# Patient Record
Sex: MALE | Race: OTHER | HISPANIC OR LATINO | Employment: FULL TIME | URBAN - METROPOLITAN AREA
[De-identification: names, ages, dates, MRNs, and addresses within clinical notes are randomized per-mention and may not be internally consistent; named-entity substitution may affect disease eponyms.]

---

## 2017-05-04 ENCOUNTER — TELEPHONE (OUTPATIENT)
Dept: MEDICAL GROUP | Facility: MEDICAL CENTER | Age: 27
End: 2017-05-04

## 2017-05-04 NOTE — TELEPHONE ENCOUNTER
Phone Number Called: [phone] (home)     Message: Left msg for patient reminding him of appt with . I told patient to provide ins, med, and to arrive 15 mns prior to appt.    Left Message for patient to call back: N\A

## 2017-05-11 ENCOUNTER — OFFICE VISIT (OUTPATIENT)
Dept: MEDICAL GROUP | Facility: MEDICAL CENTER | Age: 27
End: 2017-05-11
Payer: COMMERCIAL

## 2017-05-11 VITALS
HEART RATE: 64 BPM | OXYGEN SATURATION: 98 % | HEIGHT: 74 IN | SYSTOLIC BLOOD PRESSURE: 116 MMHG | BODY MASS INDEX: 24.2 KG/M2 | TEMPERATURE: 99.9 F | WEIGHT: 188.6 LBS | RESPIRATION RATE: 16 BRPM | DIASTOLIC BLOOD PRESSURE: 74 MMHG

## 2017-05-11 DIAGNOSIS — N46.9: ICD-10-CM

## 2017-05-11 DIAGNOSIS — Z76.89 ENCOUNTER TO ESTABLISH CARE: ICD-10-CM

## 2017-05-11 PROCEDURE — 99204 OFFICE O/P NEW MOD 45 MIN: CPT | Performed by: PHYSICIAN ASSISTANT

## 2017-05-11 ASSESSMENT — PATIENT HEALTH QUESTIONNAIRE - PHQ9: CLINICAL INTERPRETATION OF PHQ2 SCORE: 0

## 2017-05-11 NOTE — ASSESSMENT & PLAN NOTE
This is a 26-year-old male who is here today to get a semen analysis. He and his wife have been trying to have children. They're been issues with him having possibly infertility issues. Her infertility physician is asking for him to have a semen analysis performed. His wife is currently on Clomid and is being treated for PCOS. He has no health concerns. He has no medical history. No known family history. Doesn't smoke drink or do any drugs.

## 2017-05-11 NOTE — PROGRESS NOTES
"Subjective:   Philip Jang is a 26 y.o. male here today for a request for semen analysis.    Infertility, male, mild  This is a 26-year-old male who is here today to get a semen analysis. He and his wife have been trying to have children. They're been issues with him having possibly infertility issues. Her infertility physician is asking for him to have a semen analysis performed. His wife is currently on Clomid and is being treated for PCOS. He has no health concerns. He has no medical history. No known family history. Doesn't smoke drink or do any drugs.       Current medicines (including changes today)  No current outpatient prescriptions on file.     No current facility-administered medications for this visit.     He  has no past medical history on file.    ROS   No chest pain, no shortness of breath, no abdominal pain and all other systems were reviewed and are negative.       Objective:     Blood pressure 116/74, pulse 64, temperature 37.7 °C (99.9 °F), resp. rate 16, height 1.867 m (6' 1.5\"), weight 85.548 kg (188 lb 9.6 oz), SpO2 98 %. Body mass index is 24.54 kg/(m^2).   Physical Exam:  Constitutional: Alert, no distress.  Skin: Warm, dry, good turgor, no rashes in visible areas.  Eye: Equal, round and reactive, conjunctiva clear, lids normal.  ENMT: Lips without lesions, good dentition, oropharynx clear.  Neck: Trachea midline, no masses.   Lymph: No cervical or supraclavicular lymphadenopathy  Respiratory: Unlabored respiratory effort, lungs clear to auscultation, no wheezes, no ronchi.  Cardiovascular: Normal S1, S2, no murmur, no edema.  Abdomen: Soft, non-tender, no masses.  Psych: Alert and oriented x3, normal affect and mood.        Assessment and Plan:   The following treatment plan was discussed    1. Infertility, male, mild  Ordered semen analysis. [facility] testing and then contact us after we can be hasn't been notified.  - SEMEN ANALYSIS,BASIC; Future    2. " Encounter to establish care        Followup: Return if symptoms worsen or fail to improve.    Please note that this dictation was created using voice recognition software. I have made every reasonable attempt to correct obvious errors, but I expect that there are errors of grammar and possibly content that I did not discover before finalizing the note.

## 2017-05-22 ENCOUNTER — TELEPHONE (OUTPATIENT)
Dept: MEDICAL GROUP | Facility: MEDICAL CENTER | Age: 27
End: 2017-05-22

## 2017-05-22 DIAGNOSIS — N46.9: ICD-10-CM

## 2017-05-22 NOTE — TELEPHONE ENCOUNTER
Phone Number Called: [phone] (home)     Message: Left message notifying patient that per provider his semen analysis was normal. Informed patient that if he needs a copy of the lab I can mail it to him.    Left Message for patient to call back: no

## 2024-01-11 ENCOUNTER — OFFICE VISIT (OUTPATIENT)
Dept: URGENT CARE | Facility: PHYSICIAN GROUP | Age: 34
End: 2024-01-11

## 2024-01-11 VITALS
SYSTOLIC BLOOD PRESSURE: 148 MMHG | HEART RATE: 70 BPM | OXYGEN SATURATION: 99 % | BODY MASS INDEX: 27.98 KG/M2 | WEIGHT: 218 LBS | RESPIRATION RATE: 18 BRPM | DIASTOLIC BLOOD PRESSURE: 82 MMHG | HEIGHT: 74 IN | TEMPERATURE: 98.2 F

## 2024-01-11 DIAGNOSIS — Z88.9 HX OF ALLERGY: ICD-10-CM

## 2024-01-11 DIAGNOSIS — H57.89 EYE SWELLING, BILATERAL: ICD-10-CM

## 2024-01-11 PROCEDURE — 99213 OFFICE O/P EST LOW 20 MIN: CPT

## 2024-01-11 PROCEDURE — 3079F DIAST BP 80-89 MM HG: CPT

## 2024-01-11 PROCEDURE — 3077F SYST BP >= 140 MM HG: CPT

## 2024-01-11 RX ORDER — METHYLPREDNISOLONE 4 MG/1
TABLET ORAL
Qty: 21 TABLET | Refills: 0 | Status: SHIPPED | OUTPATIENT
Start: 2024-01-11

## 2024-01-11 RX ORDER — DIPHENHYDRAMINE HCL 25 MG
25 TABLET ORAL EVERY 6 HOURS PRN
COMMUNITY

## 2024-01-11 RX ORDER — DIPHENHYDRAMINE HYDROCHLORIDE 50 MG/ML
25 INJECTION INTRAMUSCULAR; INTRAVENOUS ONCE
Status: COMPLETED | OUTPATIENT
Start: 2024-01-11 | End: 2024-01-11

## 2024-01-11 RX ADMIN — DIPHENHYDRAMINE HYDROCHLORIDE 25 MG: 50 INJECTION INTRAMUSCULAR; INTRAVENOUS at 09:59

## 2024-01-11 ASSESSMENT — ENCOUNTER SYMPTOMS
BLURRED VISION: 0
PHOTOPHOBIA: 0
EYE PAIN: 0
EYE REDNESS: 0
CHILLS: 0
EYE DISCHARGE: 0
DOUBLE VISION: 0
FEVER: 0

## 2024-01-11 ASSESSMENT — VISUAL ACUITY: OU: 1

## 2024-01-11 NOTE — PROGRESS NOTES
CHIEF COMPLAINT  Chief Complaint   Patient presents with    Facial Swelling     X2days      Subjective:   Philip Jang is a 33 y.o. male who presents for complaints of bilateral eye swelling.  Patient reports symptoms started 2 days ago after a visit to [place].  Patient reports that the pain will [place] and have similar symptoms.  He states that he took a Benadryl last night with mild improvement in symptoms.  Patient reports associated symptoms of itchiness to his eyes.  He denies any drainage to eyes.  He denies any changes in visual acuity.  Patient denies any recent history of URI or fevers.  Patient denies any swelling to lips, hoarseness of voice or itchiness to throat.  He states that he was placing antibiotic drops to eyes, and reports increased irritation with use.        Review of Systems   Constitutional:  Negative for chills and fever.   HENT:  Negative for congestion.    Eyes:  Negative for blurred vision, double vision, photophobia, pain, discharge and redness.       PAST MEDICAL HISTORY  Patient Active Problem List    Diagnosis Date Noted    Infertility, male, mild 05/11/2017       SURGICAL HISTORY  patient denies any surgical history    ALLERGIES  No Known Allergies    CURRENT MEDICATIONS  Home Medications       Reviewed by Johnnie Beverly (Medical Assistant) on 01/11/24 at 0943  Med List Status: <None>     Medication Last Dose Status   diphenhydrAMINE (BENADRYL) 25 MG Tab Taking Active                    SOCIAL HISTORY  Social History     Tobacco Use    Smoking status: Never    Smokeless tobacco: Never   Vaping Use    Vaping Use: Never used   Substance and Sexual Activity    Alcohol use: Yes     Comment: occ    Drug use: No    Sexual activity: Yes     Partners: Female     Birth control/protection: Condom       FAMILY HISTORY  Family History   Problem Relation Age of Onset    No Known Problems Mother          Medications, Allergies, and current problem list reviewed today in Epic.      "Objective:     BP (!) 148/82   Pulse 70   Temp 36.8 °C (98.2 °F) (Temporal)   Resp 18   Ht 1.88 m (6' 2\")   Wt 98.9 kg (218 lb)   SpO2 99%     Physical Exam  Vitals reviewed.   Constitutional:       General: He is not in acute distress.     Appearance: Normal appearance. He is normal weight. He is not ill-appearing or toxic-appearing.   HENT:      Head: Normocephalic.      Right Ear: Tympanic membrane normal.      Left Ear: Tympanic membrane normal.      Nose: Nose normal.      Mouth/Throat:      Mouth: Mucous membranes are moist.      Pharynx: Oropharynx is clear. No oropharyngeal exudate or posterior oropharyngeal erythema.   Eyes:      General: Vision grossly intact. Gaze aligned appropriately.         Right eye: No discharge or hordeolum.         Left eye: No discharge or hordeolum.      Extraocular Movements:      Right eye: Normal extraocular motion.      Left eye: Normal extraocular motion.      Conjunctiva/sclera:      Right eye: Right conjunctiva is not injected. No chemosis.     Left eye: Left conjunctiva is not injected. No chemosis.     Pupils: Pupils are equal, round, and reactive to light.      Comments: Mild swelling to bilateral upper and lower lid.  Patient is able to open his eyes fully.  No pain to periorbital region with palpation.   Cardiovascular:      Rate and Rhythm: Normal rate and regular rhythm.      Pulses: Normal pulses.      Heart sounds: Normal heart sounds.   Pulmonary:      Effort: Pulmonary effort is normal. No respiratory distress.      Breath sounds: Normal breath sounds. No stridor. No wheezing, rhonchi or rales.   Musculoskeletal:      Cervical back: Normal range of motion and neck supple. No rigidity.   Lymphadenopathy:      Cervical: No cervical adenopathy.   Skin:     General: Skin is warm.      Capillary Refill: Capillary refill takes less than 2 seconds.   Neurological:      General: No focal deficit present.      Mental Status: He is alert.   Psychiatric:         " Mood and Affect: Mood normal.         Assessment/Plan:     Diagnosis and associated orders:     1. Eye swelling, bilateral  methylPREDNISolone (MEDROL DOSEPAK) 4 MG Tablet Therapy Pack    diphenhydrAMINE (Benadryl) injection 25 mg      2. Hx of allergy           Comments/MDM:     Upon exam patient is alert apparent signs of distress.  Mild periorbital swelling appreciated to bilateral eyes.  Patient is able to open his eyes fully.  No pain with palpation.  No redness to to eyes or discharge appreciated.  Conjunctive are normal.  No angioedema appreciated.  Mucous membranes are moist and clear.  No pharyngeal swelling or erythema.  Neck is supple.  He is clear to auscultation bilaterally.  Normal respiratory effort.  Vital signs are stable in clinic, he is afebrile.  SpO2 99%  Patient counseled on blood pressure in clinic.  Educated to repeat blood pressure once feeling better and at home.  25 mg of IM Benadryl provided in clinic.  Patient notified of side effects.  There is a family member in clinic today that can drive patient home.  Instructed patient to begin taking Zyrtec tomorrow, and on a daily basis.  Prescription for Medrol Dosepak sent to preferred pharmacy for alleviation of swelling.  Patient counseled to abstain from Motrin while using steroids.  Instructed to  artificial tears or lubricating eyedrops, and place cold pack to eyes for alleviation of symptoms.  Red flag signs and symptoms discussed at length.  Instructed to return to ER or urgent care immediately if symptoms worsen or fail to improve.         Differential diagnosis, natural history, supportive care, and indications for immediate follow-up discussed.    Advised the patient to follow-up with the primary care physician for recheck, reevaluation, and consideration of further management.    Please note that this dictation was created using voice recognition software. I have made a reasonable attempt to correct obvious errors, but I  expect that there are errors of grammar and possibly content that I did not discover before finalizing the note.    This note was electronically signed by GORAN Ernst

## 2025-02-23 ENCOUNTER — OFFICE VISIT (OUTPATIENT)
Dept: URGENT CARE | Facility: PHYSICIAN GROUP | Age: 35
End: 2025-02-23
Payer: COMMERCIAL

## 2025-02-23 ENCOUNTER — APPOINTMENT (OUTPATIENT)
Dept: RADIOLOGY | Facility: IMAGING CENTER | Age: 35
End: 2025-02-23
Attending: NURSE PRACTITIONER
Payer: COMMERCIAL

## 2025-02-23 VITALS
BODY MASS INDEX: 28.63 KG/M2 | TEMPERATURE: 98.6 F | RESPIRATION RATE: 16 BRPM | DIASTOLIC BLOOD PRESSURE: 86 MMHG | WEIGHT: 223 LBS | HEART RATE: 75 BPM | OXYGEN SATURATION: 95 % | SYSTOLIC BLOOD PRESSURE: 118 MMHG

## 2025-02-23 DIAGNOSIS — M25.562 ACUTE PAIN OF LEFT KNEE: ICD-10-CM

## 2025-02-23 PROCEDURE — 3079F DIAST BP 80-89 MM HG: CPT | Performed by: NURSE PRACTITIONER

## 2025-02-23 PROCEDURE — 3074F SYST BP LT 130 MM HG: CPT | Performed by: NURSE PRACTITIONER

## 2025-02-23 PROCEDURE — 73562 X-RAY EXAM OF KNEE 3: CPT | Mod: TC,LT | Performed by: RADIOLOGY

## 2025-02-23 PROCEDURE — 99213 OFFICE O/P EST LOW 20 MIN: CPT | Performed by: NURSE PRACTITIONER

## 2025-02-23 RX ORDER — NAPROXEN 500 MG/1
500 TABLET ORAL
Qty: 30 TABLET | Refills: 0 | Status: SHIPPED | OUTPATIENT
Start: 2025-02-23 | End: 2025-03-10

## 2025-02-23 ASSESSMENT — ENCOUNTER SYMPTOMS
JOINT SWELLING: 0
FEVER: 0
MYALGIAS: 1

## 2025-02-23 NOTE — LETTER
[facility] [place]  [address]  [place] [address]     February 23, 2025    Patient: Philip Jang   YOB: 1990   Date of Visit: 2/23/2025       To Whom It May Concern:    Philip Jang was seen and treated in our department on 2/23/2025. It's my medical opinion that this patient would benefit from rest and recuperation for 4 days.   May return to work/school/ on 02/27/2025, or sooner if feeling better.      Sincerely,     FELIX Foy.

## 2025-02-23 NOTE — PATIENT INSTRUCTIONS
Musculoskeletal Pain  Musculoskeletal pain refers to aches and pains in your bones, joints, muscles, and the tissues that surround them. This pain can occur in any part of the body. It can last for a short time (acute) or a long time (chronic).  A physical exam, lab tests, and imaging studies may be done to find the cause of your musculoskeletal pain.  Follow these instructions at home:  Lifestyle  Try to control or lower your stress levels. Stress increases muscle tension and can worsen musculoskeletal pain. It is important to recognize when you are anxious or stressed and learn ways to manage it. This may include:  Meditation or yoga.  Cognitive or behavioral therapy.  Acupuncture or massage therapy.  You may continue all activities unless the activities cause more pain. When the pain gets better, slowly resume your normal activities. Gradually increase the intensity and duration of your activities or exercise.  Managing pain, stiffness, and swelling         Treatment may include medicines for pain and inflammation that are taken by mouth or applied to the skin. Take over-the-counter and prescription medicines only as told by your health care provider.  When your pain is severe, bed rest may be helpful. Lie or sit in any position that is comfortable, but get out of bed and walk around at least every couple of hours.  If directed, apply heat to the affected area as often as told by your health care provider. Use the heat source that your health care provider recommends, such as a moist heat pack or a heating pad.  Place a towel between your skin and the heat source.  Leave the heat on for 20-30 minutes.  Remove the heat if your skin turns bright red. This is especially important if you are unable to feel pain, heat, or cold. You may have a greater risk of getting burned.  If directed, put ice on the painful area. To do this:  Put ice in a plastic bag.  Place a towel between your skin and the bag.  Leave the ice on  for 20 minutes, 2-3 times a day.  Remove the ice if your skin turns bright red. This is very important. If you cannot feel pain, heat, or cold, you have a greater risk of damage to the area.  General instructions  Your health care provider may recommend that you see a physical therapist. This person can help you come up with a safe exercise program.  If told by your health care provider, do physical therapy exercises to improve movement and strength in the affected area.  Keep all follow-up visits. This is important. This includes any physical therapy visits.  Contact a health care provider if:  Your pain gets worse.  Medicines do not help ease your pain.  You cannot use the part of your body that hurts, such as your arm, leg, or neck.  You have trouble sleeping.  You have trouble doing your normal activities.  Get help right away if:  You have a new injury and your pain is worse or different.  You feel numb or you have tingling in the painful area.  Summary  Musculoskeletal pain refers to aches and pains in your bones, joints, muscles, and the tissues that surround them.  This pain can occur in any part of the body.  Your health care provider may recommend that you see a physical therapist. This person can help you come up with a safe exercise program. Do any exercises as told by your physical therapist.  Lower your stress level. Stress can worsen musculoskeletal pain. Ways to lower stress may include meditation, yoga, cognitive or behavioral therapy, acupuncture, and massage therapy.  This information is not intended to replace advice given to you by your health care provider. Make sure you discuss any questions you have with your health care provider.  Document Revised: 04/22/2021 Document Reviewed: 03/31/2021  Elsevier Patient Education © 2023 [employer]

## 2025-02-23 NOTE — PROGRESS NOTES
Patient/parent/guardian has consented to treatment and for use of patient information for treatment and billing purposes.  Subjective:   Philip Jang  is a 34 y.o. male who presents for Knee Injury (L knee injury , playing pickle ball x2 days )       Knee Pain  This is a new problem. The current episode started in the past 7 days. The problem occurs intermittently. The problem has been waxing and waning. Associated symptoms include myalgias. Pertinent negatives include no fever or joint swelling. The symptoms are aggravated by walking and twisting. He has tried NSAIDs (biofreeze, arnica) for the symptoms. The treatment provided mild relief.   Had 2 episodes of walking and felt a pop in his medial left knee, and then Friday was playing pickle ball and had increased pain.  Denies numbness or tingling.  Symptoms are primarily located at the medial patella.  No previous injury or surgery to this knee.    Review of Systems   Constitutional:  Negative for fever.   Musculoskeletal:  Positive for myalgias. Negative for joint swelling.         CURRENT MEDICATIONS:  diphenhydrAMINE Tabs  methylPREDNISolone Tbpk  Allergies:   No Known Allergies  Current Problems: Philip Jang does not have any pertinent problems on file.  Past Surgical Hx:  No past surgical history on file.   Past Social Hx:  reports that he has never smoked. He has never used smokeless tobacco. He reports current alcohol use. He reports that he does not use drugs.    Objective:   /86 (BP Location: Right arm, Patient Position: Sitting, BP Cuff Size: Adult)   Pulse 75   Temp 37 °C (98.6 °F) (Temporal)   Resp 16   Wt 101 kg (223 lb)   SpO2 95%   BMI 28.63 kg/m²   Physical Exam  Vitals and nursing note reviewed.   Constitutional:       Appearance: Normal appearance. He is not ill-appearing.   HENT:      Right Ear: External ear normal.      Left Ear: External ear normal.      Nose: Nose normal.      Mouth/Throat:      Mouth: Mucous membranes are  moist.   Eyes:      Extraocular Movements: Extraocular movements intact.      Conjunctiva/sclera: Conjunctivae normal.      Pupils: Pupils are equal, round, and reactive to light.   Cardiovascular:      Rate and Rhythm: Normal rate.   Pulmonary:      Effort: Pulmonary effort is normal.   Musculoskeletal:         General: Tenderness present. No deformity. Normal range of motion.      Cervical back: Normal range of motion and neck supple.      Left knee: Swelling and effusion present. No deformity, erythema, bony tenderness or crepitus. Tenderness present over the MCL. No MCL laxity.Normal alignment. Normal pulse.      Instability Tests: Anterior drawer test negative. Posterior drawer test negative. Medial Barby test negative.      Left lower leg: No edema.   Skin:     General: Skin is warm and dry.   Neurological:      General: No focal deficit present.      Mental Status: He is alert.     DX-KNEE 3 VIEWS Pain/Deformity Following Trauma  Result Date: 2/23/2025 2/23/2025 10:33 AM HISTORY/REASON FOR EXAM:  Pain/Deformity Following Trauma; medial knee pain. TECHNIQUE/EXAM DESCRIPTION AND NUMBER OF VIEWS:  3 views of the LEFT knee. COMPARISON: None FINDINGS: No fracture, malalignment, or gross soft tissue abnormality.     Normal exam.      Assessment/Plan:   1. Acute pain of left knee  - DX-KNEE 3 VIEWS Pain/Deformity Following Trauma  - Knee Brace  - naproxen (NAPROSYN) 500 MG Tab; Take 1 Tablet by mouth 2 times daily with meals as needed (Knee pain) for up to 15 days.  Dispense: 30 Tablet; Refill: 0    34-year-old male presents with left knee pain.  Vital signs stable, afebrile.  No acute distress does not appear ill.  Exam demonstrates left knee medial tenderness at the MCL line.  Slight localized swelling without crepitus or erythema.  Negative anterior and posterior drawer testing. Discussed differential diagnosis including muscle strain, MCL injury, contusion, and osteoarthritis.  Imaging ordered, negative for  acute findings.  Knee brace provided.  Recommend rest, ice, compression with knee brace, and elevation at the end of the day.  Injuries can take 4 to 6 weeks to fully resolve.  Recommend following up with sports medicine if no improvement after 10 days. Red flags, RTC and ER precautions discussed, with patient confirming their understanding of  need for immediate follow-up.  Discussed medication management options, risks and benefits, and alternatives to treatment plan agreed upon. Instructed to continue  medications without changes as ordered by primary care unless aforementioned above.  Patient expresses understanding and agrees to plan of care. All questions or concerns answered. For my MDM, I have personally reviewed previous notes, and test results as pertinent to today's visit.  11:19 AM  Patient declined brace.   Please note that this dictation was created using voice recognition software. I have made every reasonable attempt to correct obvious errors,  but there may be grammar errors, and possibly content that I did not discover before finalizing the note.   This note was electronically signed by GORAN Foy